# Patient Record
Sex: MALE | Race: WHITE | NOT HISPANIC OR LATINO
[De-identification: names, ages, dates, MRNs, and addresses within clinical notes are randomized per-mention and may not be internally consistent; named-entity substitution may affect disease eponyms.]

---

## 2017-06-30 ENCOUNTER — APPOINTMENT (OUTPATIENT)
Dept: GASTROENTEROLOGY | Facility: CLINIC | Age: 42
End: 2017-06-30

## 2017-06-30 VITALS
SYSTOLIC BLOOD PRESSURE: 140 MMHG | OXYGEN SATURATION: 98 % | BODY MASS INDEX: 32.18 KG/M2 | HEART RATE: 81 BPM | WEIGHT: 205 LBS | TEMPERATURE: 98.2 F | DIASTOLIC BLOOD PRESSURE: 80 MMHG | HEIGHT: 67 IN

## 2017-06-30 DIAGNOSIS — Z87.39 PERSONAL HISTORY OF OTHER DISEASES OF THE MUSCULOSKELETAL SYSTEM AND CONNECTIVE TISSUE: ICD-10-CM

## 2017-06-30 DIAGNOSIS — Z82.49 FAMILY HISTORY OF ISCHEMIC HEART DISEASE AND OTHER DISEASES OF THE CIRCULATORY SYSTEM: ICD-10-CM

## 2017-06-30 DIAGNOSIS — Z78.9 OTHER SPECIFIED HEALTH STATUS: ICD-10-CM

## 2017-06-30 DIAGNOSIS — M54.9 DORSALGIA, UNSPECIFIED: ICD-10-CM

## 2017-06-30 DIAGNOSIS — Z80.43 FAMILY HISTORY OF MALIGNANT NEOPLASM OF TESTIS: ICD-10-CM

## 2017-06-30 DIAGNOSIS — Z80.42 FAMILY HISTORY OF MALIGNANT NEOPLASM OF PROSTATE: ICD-10-CM

## 2017-06-30 DIAGNOSIS — R10.11 RIGHT UPPER QUADRANT PAIN: ICD-10-CM

## 2017-06-30 PROBLEM — Z00.00 ENCOUNTER FOR PREVENTIVE HEALTH EXAMINATION: Status: ACTIVE | Noted: 2017-06-30

## 2017-06-30 RX ORDER — HYDROCORTISONE 25 MG/ML
2.5 LOTION TOPICAL
Qty: 59 | Refills: 0 | Status: ACTIVE | COMMUNITY
Start: 2017-01-04

## 2017-06-30 RX ORDER — FLURANDRENOLIDE 0.5 MG/ML
0.05 LOTION TOPICAL
Qty: 120 | Refills: 0 | Status: ACTIVE | COMMUNITY
Start: 2017-01-05

## 2017-07-02 ENCOUNTER — FORM ENCOUNTER (OUTPATIENT)
Age: 42
End: 2017-07-02

## 2017-07-03 ENCOUNTER — RESULT REVIEW (OUTPATIENT)
Age: 42
End: 2017-07-03

## 2017-07-03 ENCOUNTER — APPOINTMENT (OUTPATIENT)
Dept: ULTRASOUND IMAGING | Facility: IMAGING CENTER | Age: 42
End: 2017-07-03

## 2017-07-03 ENCOUNTER — OUTPATIENT (OUTPATIENT)
Dept: OUTPATIENT SERVICES | Facility: HOSPITAL | Age: 42
LOS: 1 days | End: 2017-07-03
Payer: COMMERCIAL

## 2017-07-03 DIAGNOSIS — R10.11 RIGHT UPPER QUADRANT PAIN: ICD-10-CM

## 2017-07-03 LAB
ALBUMIN SERPL ELPH-MCNC: 4.7 G/DL
ALP BLD-CCNC: 64 U/L
ALT SERPL-CCNC: 27 U/L
ANION GAP SERPL CALC-SCNC: 18 MMOL/L
AST SERPL-CCNC: 31 U/L
BASOPHILS # BLD AUTO: 0.01 K/UL
BASOPHILS NFR BLD AUTO: 0.2 %
BILIRUB SERPL-MCNC: 0.6 MG/DL
BUN SERPL-MCNC: 11 MG/DL
CALCIUM SERPL-MCNC: 9.7 MG/DL
CHLORIDE SERPL-SCNC: 100 MMOL/L
CO2 SERPL-SCNC: 22 MMOL/L
CREAT SERPL-MCNC: 1.09 MG/DL
EOSINOPHIL # BLD AUTO: 0.11 K/UL
EOSINOPHIL NFR BLD AUTO: 1.9 %
GLUCOSE SERPL-MCNC: 81 MG/DL
HCT VFR BLD CALC: 47 %
HGB BLD-MCNC: 15.4 G/DL
IMM GRANULOCYTES NFR BLD AUTO: 0 %
LPL SERPL-CCNC: 63 U/L
LYMPHOCYTES # BLD AUTO: 1.65 K/UL
LYMPHOCYTES NFR BLD AUTO: 27.9 %
MAN DIFF?: NORMAL
MCHC RBC-ENTMCNC: 28.7 PG
MCHC RBC-ENTMCNC: 32.8 GM/DL
MCV RBC AUTO: 87.5 FL
MONOCYTES # BLD AUTO: 0.45 K/UL
MONOCYTES NFR BLD AUTO: 7.6 %
NEUTROPHILS # BLD AUTO: 3.7 K/UL
NEUTROPHILS NFR BLD AUTO: 62.4 %
PLATELET # BLD AUTO: 195 K/UL
POTASSIUM SERPL-SCNC: 4.3 MMOL/L
PROT SERPL-MCNC: 7.8 G/DL
RBC # BLD: 5.37 M/UL
RBC # FLD: 13.7 %
SODIUM SERPL-SCNC: 140 MMOL/L
WBC # FLD AUTO: 5.92 K/UL

## 2017-07-03 PROCEDURE — 76700 US EXAM ABDOM COMPLETE: CPT | Mod: 26

## 2017-07-03 PROCEDURE — 76700 US EXAM ABDOM COMPLETE: CPT

## 2017-07-06 LAB
AMYLASE P SERPL-CCNC: 31 U/L
AMYLASE S SERPL-CCNC: 12 U/L
AMYLASE SERPL-CCNC: 43 U/L

## 2019-09-11 ENCOUNTER — TRANSCRIPTION ENCOUNTER (OUTPATIENT)
Age: 44
End: 2019-09-11

## 2021-02-24 ENCOUNTER — TRANSCRIPTION ENCOUNTER (OUTPATIENT)
Age: 46
End: 2021-02-24

## 2021-02-25 ENCOUNTER — TRANSCRIPTION ENCOUNTER (OUTPATIENT)
Age: 46
End: 2021-02-25

## 2021-03-29 ENCOUNTER — TRANSCRIPTION ENCOUNTER (OUTPATIENT)
Age: 46
End: 2021-03-29

## 2021-06-11 ENCOUNTER — TRANSCRIPTION ENCOUNTER (OUTPATIENT)
Age: 46
End: 2021-06-11

## 2021-11-12 ENCOUNTER — TRANSCRIPTION ENCOUNTER (OUTPATIENT)
Age: 46
End: 2021-11-12

## 2022-11-03 ENCOUNTER — OFFICE (OUTPATIENT)
Dept: URBAN - METROPOLITAN AREA CLINIC 35 | Facility: CLINIC | Age: 47
Setting detail: OPHTHALMOLOGY
End: 2022-11-03
Payer: COMMERCIAL

## 2022-11-03 DIAGNOSIS — H40.013: ICD-10-CM

## 2022-11-03 DIAGNOSIS — H40.053: ICD-10-CM

## 2022-11-03 PROCEDURE — 92014 COMPRE OPH EXAM EST PT 1/>: CPT | Performed by: OPHTHALMOLOGY

## 2022-11-03 PROCEDURE — 92133 CPTRZD OPH DX IMG PST SGM ON: CPT | Performed by: OPHTHALMOLOGY

## 2022-11-03 PROCEDURE — 92083 EXTENDED VISUAL FIELD XM: CPT | Performed by: OPHTHALMOLOGY

## 2022-11-03 PROCEDURE — 92020 GONIOSCOPY: CPT | Performed by: OPHTHALMOLOGY

## 2022-11-03 ASSESSMENT — REFRACTION_AUTOREFRACTION
OS_AXIS: 050
OS_SPHERE: +0.50
OD_CYLINDER: -1.00
OS_CYLINDER: -0.50
OD_AXIS: 106
OD_SPHERE: +0.75

## 2022-11-03 ASSESSMENT — REFRACTION_MANIFEST
OD_CYLINDER: -0.50
OD_VA1: 20/20
OD_AXIS: 105
OS_CYLINDER: SPHERE
OD_SPHERE: +0.50
OS_VA1: 20/20
OS_SPHERE: PLANO

## 2022-11-03 ASSESSMENT — CONFRONTATIONAL VISUAL FIELD TEST (CVF)
OD_FINDINGS: FULL
OS_FINDINGS: FULL

## 2022-11-03 ASSESSMENT — KERATOMETRY
OD_AXISANGLE_DEGREES: 044
METHOD_AUTO_MANUAL: AUTO
OD_K2POWER_DIOPTERS: 43.25
OS_K2POWER_DIOPTERS: 43.00
OS_K1POWER_DIOPTERS: 42.50
OS_AXISANGLE_DEGREES: 145
OD_K1POWER_DIOPTERS: 42.50

## 2022-11-03 ASSESSMENT — SPHEQUIV_DERIVED
OD_SPHEQUIV: 0.25
OS_SPHEQUIV: 0.25
OD_SPHEQUIV: 0.25

## 2022-11-03 ASSESSMENT — VISUAL ACUITY
OS_BCVA: 20/20-1
OD_BCVA: 20/25-2

## 2022-11-03 ASSESSMENT — PACHYMETRY
OD_CT_UM: 587
OD_CT_CORRECTION: -3
OS_CT_CORRECTION: -3
OS_CT_UM: 583

## 2022-11-03 ASSESSMENT — AXIALLENGTH_DERIVED
OD_AL: 23.7248
OD_AL: 23.7248
OS_AL: 23.7713

## 2022-11-17 ENCOUNTER — OFFICE (OUTPATIENT)
Dept: URBAN - METROPOLITAN AREA CLINIC 35 | Facility: CLINIC | Age: 47
Setting detail: OPHTHALMOLOGY
End: 2022-11-17
Payer: COMMERCIAL

## 2022-11-17 DIAGNOSIS — H40.013: ICD-10-CM

## 2022-11-17 DIAGNOSIS — H40.053: ICD-10-CM

## 2022-11-17 PROCEDURE — 99213 OFFICE O/P EST LOW 20 MIN: CPT | Performed by: OPHTHALMOLOGY

## 2022-11-17 ASSESSMENT — AXIALLENGTH_DERIVED
OD_AL: 23.7248
OD_AL: 23.7248
OS_AL: 23.7713

## 2022-11-17 ASSESSMENT — REFRACTION_CURRENTRX
OS_SPHERE: +1.00
OD_VPRISM_DIRECTION: SV
OS_OVR_VA: 20/
OS_VPRISM_DIRECTION: SV
OD_OVR_VA: 20/
OD_SPHERE: +1.00

## 2022-11-17 ASSESSMENT — VISUAL ACUITY
OD_BCVA: 20/20
OS_BCVA: 20/20

## 2022-11-17 ASSESSMENT — KERATOMETRY
METHOD_AUTO_MANUAL: AUTO
OS_K1POWER_DIOPTERS: 42.50
OS_AXISANGLE_DEGREES: 145
OD_K2POWER_DIOPTERS: 43.25
OD_AXISANGLE_DEGREES: 044
OS_K2POWER_DIOPTERS: 43.00
OD_K1POWER_DIOPTERS: 42.50

## 2022-11-17 ASSESSMENT — REFRACTION_AUTOREFRACTION
OS_CYLINDER: -0.50
OD_SPHERE: +0.75
OS_SPHERE: +0.50
OD_AXIS: 106
OD_CYLINDER: -1.00
OS_AXIS: 050

## 2022-11-17 ASSESSMENT — PACHYMETRY
OS_CT_UM: 583
OD_CT_CORRECTION: -3
OD_CT_UM: 587
OS_CT_CORRECTION: -3

## 2022-11-17 ASSESSMENT — REFRACTION_MANIFEST
OS_VA1: 20/20
OS_CYLINDER: SPHERE
OD_AXIS: 105
OD_VA1: 20/20
OD_SPHERE: +0.50
OD_CYLINDER: -0.50
OS_SPHERE: PLANO

## 2022-11-17 ASSESSMENT — TONOMETRY: OD_IOP_MMHG: 21

## 2022-11-17 ASSESSMENT — SPHEQUIV_DERIVED
OD_SPHEQUIV: 0.25
OD_SPHEQUIV: 0.25
OS_SPHEQUIV: 0.25

## 2022-12-29 ENCOUNTER — RX ONLY (RX ONLY)
Age: 47
End: 2022-12-29

## 2022-12-29 ENCOUNTER — OFFICE (OUTPATIENT)
Dept: URBAN - METROPOLITAN AREA CLINIC 35 | Facility: CLINIC | Age: 47
Setting detail: OPHTHALMOLOGY
End: 2022-12-29
Payer: COMMERCIAL

## 2022-12-29 DIAGNOSIS — H40.053: ICD-10-CM

## 2022-12-29 DIAGNOSIS — H40.013: ICD-10-CM

## 2022-12-29 PROCEDURE — 99213 OFFICE O/P EST LOW 20 MIN: CPT | Performed by: OPHTHALMOLOGY

## 2022-12-29 ASSESSMENT — KERATOMETRY
OD_AXISANGLE_DEGREES: 031
OS_K1POWER_DIOPTERS: 43.00
OD_K1POWER_DIOPTERS: 42.50
METHOD_AUTO_MANUAL: AUTO
OS_AXISANGLE_DEGREES: 090
OS_K2POWER_DIOPTERS: 43.00
OD_K2POWER_DIOPTERS: 43.00

## 2022-12-29 ASSESSMENT — AXIALLENGTH_DERIVED
OD_AL: 23.7713
OD_AL: 23.7713

## 2022-12-29 ASSESSMENT — REFRACTION_AUTOREFRACTION
OD_SPHERE: +0.75
OD_CYLINDER: -1.00
OS_SPHERE: PLANO
OS_CYLINDER: -0.50
OS_AXIS: 121
OD_AXIS: 109

## 2022-12-29 ASSESSMENT — VISUAL ACUITY
OS_BCVA: 20/20
OD_BCVA: 20/20

## 2022-12-29 ASSESSMENT — REFRACTION_CURRENTRX
OS_OVR_VA: 20/
OD_SPHERE: +1.00
OS_VPRISM_DIRECTION: SV
OD_VPRISM_DIRECTION: SV
OD_OVR_VA: 20/
OS_SPHERE: +1.00

## 2022-12-29 ASSESSMENT — PACHYMETRY
OS_CT_UM: 583
OD_CT_CORRECTION: -3
OD_CT_UM: 587
OS_CT_CORRECTION: -3

## 2022-12-29 ASSESSMENT — SPHEQUIV_DERIVED
OD_SPHEQUIV: 0.25
OD_SPHEQUIV: 0.25

## 2022-12-29 ASSESSMENT — REFRACTION_MANIFEST
OS_CYLINDER: SPHERE
OS_VA1: 20/20
OD_SPHERE: +0.50
OS_SPHERE: PLANO
OD_VA1: 20/20
OD_CYLINDER: -0.50
OD_AXIS: 105

## 2022-12-29 ASSESSMENT — TONOMETRY
OD_IOP_MMHG: 16
OS_IOP_MMHG: 16

## 2022-12-29 ASSESSMENT — CONFRONTATIONAL VISUAL FIELD TEST (CVF)
OS_FINDINGS: FULL
OD_FINDINGS: FULL

## 2023-04-19 ENCOUNTER — APPOINTMENT (OUTPATIENT)
Dept: UROLOGY | Facility: CLINIC | Age: 48
End: 2023-04-19
Payer: COMMERCIAL

## 2023-04-19 VITALS
RESPIRATION RATE: 17 BRPM | TEMPERATURE: 98.4 F | BODY MASS INDEX: 28.88 KG/M2 | HEART RATE: 72 BPM | DIASTOLIC BLOOD PRESSURE: 93 MMHG | WEIGHT: 195 LBS | SYSTOLIC BLOOD PRESSURE: 138 MMHG | HEIGHT: 69 IN

## 2023-04-19 DIAGNOSIS — R31.29 OTHER MICROSCOPIC HEMATURIA: ICD-10-CM

## 2023-04-19 PROCEDURE — 99203 OFFICE O/P NEW LOW 30 MIN: CPT

## 2023-04-19 PROCEDURE — 88112 CYTOPATH CELL ENHANCE TECH: CPT | Mod: 26

## 2023-04-19 NOTE — HISTORY OF PRESENT ILLNESS
[FreeTextEntry1] : Reason for Visit: Hematuria\par \par This is a 48 year-year-old gentleman with microscopic hematuria.  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  The patient has no history of gross hematuria.  He has a family history of kidney stones he has a family history of prostate cancer.  His primary care doctor had a complete metabolic panel done which showed a PSA of 0.6 and normal creatinine liver function tests are normal he has had microscopic hematuria on prior urinalysis no gross hematuria.  No tobacco history.  No exposure to harsh chemicals.\par \par Patient is referred for evaluation of his condition. Patient denies any gross hematuria or dysuria or urinary incontinence. The patient denies any aggravating or relieving factors. The patient denies any interference of function. The patient is entirely asymptomatic. All other review of systems are negative. Past medical history, family history and social history were inquired and were noncontributory to current condition. Patient [denies tobacco use]. Medications and allergies were reviewed.\par \par On examination, the patient is a healthy-appearing gentleman in no acute distress. He is alert and oriented follows commands. He has normal mood and affect. He is normocephalic. Oral no thrush. Neck is supple. Respirations are unlabored. \par \par Assessment: Microscopic hematuria\par \par PLAN: Renal US, Cysto and urine cytology \par \par I counseled the patient. I discussed the various etiologies of his symptoms. []. Risks and alternatives were discussed. I answered the patient questions. The patient will follow-up as directed and will contact me with any questions or concerns. Thank you for the opportunity to participate in the care of this patient. I'll keep you updated on his progress.\par \par Plan: _____. Followup in _____\par

## 2023-04-21 LAB — URINE CYTOLOGY: NORMAL

## 2023-04-28 ENCOUNTER — OFFICE (OUTPATIENT)
Dept: URBAN - METROPOLITAN AREA CLINIC 35 | Facility: CLINIC | Age: 48
Setting detail: OPHTHALMOLOGY
End: 2023-04-28
Payer: COMMERCIAL

## 2023-04-28 DIAGNOSIS — H40.053: ICD-10-CM

## 2023-04-28 DIAGNOSIS — H40.013: ICD-10-CM

## 2023-04-28 PROCEDURE — 99213 OFFICE O/P EST LOW 20 MIN: CPT | Performed by: OPHTHALMOLOGY

## 2023-04-28 ASSESSMENT — REFRACTION_AUTOREFRACTION
OS_CYLINDER: -0.50
OD_SPHERE: +0.75
OS_SPHERE: PLANO
OD_CYLINDER: -1.00
OS_AXIS: 121
OD_AXIS: 109

## 2023-04-28 ASSESSMENT — PACHYMETRY
OS_CT_UM: 583
OS_CT_CORRECTION: -3
OD_CT_CORRECTION: -3
OD_CT_UM: 587

## 2023-04-28 ASSESSMENT — KERATOMETRY
METHOD_AUTO_MANUAL: AUTO
OS_K2POWER_DIOPTERS: 43.00
OD_K1POWER_DIOPTERS: 42.50
OS_K1POWER_DIOPTERS: 43.00
OS_AXISANGLE_DEGREES: 090
OD_AXISANGLE_DEGREES: 031
OD_K2POWER_DIOPTERS: 43.00

## 2023-04-28 ASSESSMENT — TONOMETRY
OD_IOP_MMHG: 15
OS_IOP_MMHG: 16

## 2023-04-28 ASSESSMENT — REFRACTION_CURRENTRX
OD_SPHERE: +1.00
OS_SPHERE: +1.00
OD_VPRISM_DIRECTION: SV
OS_OVR_VA: 20/
OD_OVR_VA: 20/
OS_VPRISM_DIRECTION: SV

## 2023-04-28 ASSESSMENT — SPHEQUIV_DERIVED
OD_SPHEQUIV: 0.25
OD_SPHEQUIV: 0.25

## 2023-04-28 ASSESSMENT — REFRACTION_MANIFEST
OS_SPHERE: PLANO
OD_SPHERE: +0.50
OD_CYLINDER: -0.50
OD_VA1: 20/20
OD_AXIS: 105
OS_VA1: 20/20
OS_CYLINDER: SPHERE

## 2023-04-28 ASSESSMENT — AXIALLENGTH_DERIVED
OD_AL: 23.7713
OD_AL: 23.7713

## 2023-04-28 ASSESSMENT — VISUAL ACUITY
OD_BCVA: 20/20
OS_BCVA: 20/25

## 2023-04-28 ASSESSMENT — CONFRONTATIONAL VISUAL FIELD TEST (CVF)
OD_FINDINGS: FULL
OS_FINDINGS: FULL

## 2023-05-22 ENCOUNTER — APPOINTMENT (OUTPATIENT)
Dept: UROLOGY | Facility: CLINIC | Age: 48
End: 2023-05-22
Payer: COMMERCIAL

## 2023-05-22 ENCOUNTER — OUTPATIENT (OUTPATIENT)
Dept: OUTPATIENT SERVICES | Facility: HOSPITAL | Age: 48
LOS: 1 days | End: 2023-05-22
Payer: COMMERCIAL

## 2023-05-22 DIAGNOSIS — R35.0 FREQUENCY OF MICTURITION: ICD-10-CM

## 2023-05-22 PROCEDURE — 99214 OFFICE O/P EST MOD 30 MIN: CPT | Mod: 25

## 2023-05-22 PROCEDURE — 76775 US EXAM ABDO BACK WALL LIM: CPT | Mod: 26

## 2023-05-22 PROCEDURE — 76705 ECHO EXAM OF ABDOMEN: CPT

## 2023-05-22 NOTE — PHYSICAL EXAM
[General Appearance - Well Developed] : well developed [General Appearance - Well Nourished] : well nourished [Normal Appearance] : normal appearance [Well Groomed] : well groomed [General Appearance - In No Acute Distress] : no acute distress [Skin Color & Pigmentation] : normal skin color and pigmentation [Normal Station and Gait] : the gait and station were normal for the patient's age

## 2023-05-23 NOTE — HISTORY OF PRESENT ILLNESS
[FreeTextEntry1] : 04/19/2023: Reason for Visit: Hematuria\par This is a 48 year-year-old gentleman with microscopic hematuria.  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  The patient has no history of gross hematuria.  He has a family history of kidney stones he has a family history of prostate cancer.  His primary care doctor had a complete metabolic panel done which showed a PSA of 0.6 and normal creatinine liver function tests are normal he has had microscopic hematuria on prior urinalysis no gross hematuria.  No tobacco history.  No exposure to harsh chemicals.\par Patient is referred for evaluation of his condition. Patient denies any gross hematuria or dysuria or urinary incontinence. The patient denies any aggravating or relieving factors. The patient denies any interference of function. The patient is entirely asymptomatic. All other review of systems are negative. Past medical history, family history and social history were inquired and were noncontributory to current condition. Patient [denies tobacco use]. Medications and allergies were reviewed.\par On examination, the patient is a healthy-appearing gentleman in no acute distress. He is alert and oriented follows commands. He has normal mood and affect. He is normocephalic. Oral no thrush. Neck is supple. Respirations are unlabored. \par Assessment: Microscopic hematuria\par PLAN: Renal US, Cysto and urine cytology \par \par 05/22/2023: Mr. SCHAFER is a 48 year old male presenting today for a renal ultrasound and cystoscopy. He obtained the renal US and was seen later at the office. The US showed "there is a nonvascular hypoechoic focus in the left kidney lower pole cyst vs dilated calyx. Both kidneys are normal in size and echogenicity without stones, hydronephrosis, or solid masses visualized". I explain to the patient that  beside a simple cyst which is benign, his US was normal. there were no tumors and no stones. Due to issue with his co-pay, Pt asked to reschedule his cystoscopy to the [place] office. \par \par ASSESSMENT: Hematuria and LUTS. \par \par PLAN: Follow up for the cystoscopy in [place]. I reviewed at length the risks and benefits and rationale behind the cystoscopy and the patient seems to understand and wants to proceed.\par \par I counseled the patient. I discussed the various etiologies of his symptoms. Risks and alternatives were discussed. I answered the patient questions. The patient will follow-up as directed and will contact me with any questions or concerns. Thank you for the opportunity to participate in the care of this patient. I'll keep you updated on his progress. \par \par

## 2023-05-23 NOTE — ADDENDUM
[FreeTextEntry1] : This note was authored by Dedrick Heller working as a scribe for Dr. Dandre Chavez. I, Dr. Dandre Chavez have reviewed the content of this note and confirm it is true and accurate. I personally performed the history and physical examination and made all the decisions. 05/22/2023

## 2023-05-24 DIAGNOSIS — R31.9 HEMATURIA, UNSPECIFIED: ICD-10-CM

## 2023-05-24 DIAGNOSIS — N40.1 BENIGN PROSTATIC HYPERPLASIA WITH LOWER URINARY TRACT SYMPTOMS: ICD-10-CM

## 2023-06-15 ENCOUNTER — NON-APPOINTMENT (OUTPATIENT)
Age: 48
End: 2023-06-15

## 2023-06-15 ENCOUNTER — APPOINTMENT (OUTPATIENT)
Dept: ORTHOPEDIC SURGERY | Facility: CLINIC | Age: 48
End: 2023-06-15
Payer: COMMERCIAL

## 2023-06-15 VITALS
WEIGHT: 195 LBS | SYSTOLIC BLOOD PRESSURE: 135 MMHG | BODY MASS INDEX: 28.88 KG/M2 | DIASTOLIC BLOOD PRESSURE: 81 MMHG | HEIGHT: 69 IN

## 2023-06-15 DIAGNOSIS — M25.832 OTHER SPECIFIED JOINT DISORDERS, LEFT WRIST: ICD-10-CM

## 2023-06-15 PROCEDURE — 20605 DRAIN/INJ JOINT/BURSA W/O US: CPT | Mod: LT

## 2023-06-15 PROCEDURE — 99204 OFFICE O/P NEW MOD 45 MIN: CPT | Mod: 25

## 2023-06-16 ENCOUNTER — APPOINTMENT (OUTPATIENT)
Dept: UROLOGY | Facility: CLINIC | Age: 48
End: 2023-06-16
Payer: COMMERCIAL

## 2023-06-16 VITALS
DIASTOLIC BLOOD PRESSURE: 88 MMHG | BODY MASS INDEX: 28.88 KG/M2 | WEIGHT: 195 LBS | SYSTOLIC BLOOD PRESSURE: 133 MMHG | OXYGEN SATURATION: 97 % | HEIGHT: 69 IN | HEART RATE: 63 BPM

## 2023-06-16 PROCEDURE — 52000 CYSTOURETHROSCOPY: CPT

## 2023-06-16 NOTE — HISTORY OF PRESENT ILLNESS
[FreeTextEntry1] : 04/19/2023: Reason for Visit: Hematuria\par This is a 48 year-year-old gentleman with microscopic hematuria.  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  The patient has no history of gross hematuria.  He has a family history of kidney stones he has a family history of prostate cancer.  His primary care doctor had a complete metabolic panel done which showed a PSA of 0.6 and normal creatinine liver function tests are normal he has had microscopic hematuria on prior urinalysis no gross hematuria.  No tobacco history.  No exposure to harsh chemicals.\par Patient is referred for evaluation of his condition. Patient denies any gross hematuria or dysuria or urinary incontinence. The patient denies any aggravating or relieving factors. The patient denies any interference of function. The patient is entirely asymptomatic. All other review of systems are negative. Past medical history, family history and social history were inquired and were noncontributory to current condition. Patient [denies tobacco use]. Medications and allergies were reviewed.\par On examination, the patient is a healthy-appearing gentleman in no acute distress. He is alert and oriented follows commands. He has normal mood and affect. He is normocephalic. Oral no thrush. Neck is supple. Respirations are unlabored. \par Assessment: Microscopic hematuria\par PLAN: Renal US, Cysto and urine cytology \par \par 05/22/2023: Mr. SCHAFER is a 48 year old male presenting today for a renal ultrasound and cystoscopy. He obtained the renal US and was seen later at the office. The US showed "there is a nonvascular hypoechoic focus in the left kidney lower pole cyst vs dilated calyx. Both kidneys are normal in size and echogenicity without stones, hydronephrosis, or solid masses visualized". I explain to the patient that  beside a simple cyst which is benign, his US was normal. there were no tumors and no stones. Due to issue with his co-pay, Pt asked to reschedule his cystoscopy to the [place] office. \par ASSESSMENT: Hematuria and LUTS. \par PLAN: Follow up for the cystoscopy in [place]. I reviewed at length the risks and benefits and rationale behind the cystoscopy and the patient seems to understand and wants to proceed.\par \par 06/16/2023 Mr. LISSY SCHAFER is a 48 year old patient presenting today for a cystoscopy. He reports he is doing well overall. He is unchanged from last visit. He says his latest PSA screen from an outside lab was less than 2 ng/mL. \par \par After informed consent was obtained, the patient was prepped and draped in usual sterile fashion. 5 cc of 2% Lidocaine Gel was administered intraurethrally. A flexible cystoscope was advanced through the urethra into the bladder. The anterior urethra was normal. The prostatic urethra was normal. The bladder outlet was normal. The ureteral orifices were in the normal anatomic position bilaterally. No bladder tumor visualized. Mucosal abnormality was not present in the bladder wall. There's no evidence of urothelial cancer. Pt had clear reflux from the left and the right side. \par Cystoscopy shows mild prostatic enlargement with fragile blood vessels likely the cause of microhematuria. \par \par ASSESSMENT: mild BPH, hematuria, normal structural work up\par \par PLAN: pt will have a PSA screen every 2 years. Follow up PRN\par \par I counseled the patient. I discussed the various etiologies of his symptoms. Risks and alternatives were discussed. I answered the patient questions. The patient will follow-up as directed and will contact me with any questions or concerns. Thank you for the opportunity to participate in the care of this patient. I'll keep you updated on his progress. \par \par

## 2023-06-16 NOTE — ADDENDUM
[FreeTextEntry1] : This note was authored by Dedrick Heller working as a scribe for Dr. Dandre Chavez. I, Dr. Dandre Chavez have reviewed the content of this note and confirm it is true and accurate. I personally performed the history and physical examination and made all the decisions. 06/16/2023

## 2023-09-01 ENCOUNTER — OFFICE (OUTPATIENT)
Dept: URBAN - METROPOLITAN AREA CLINIC 35 | Facility: CLINIC | Age: 48
Setting detail: OPHTHALMOLOGY
End: 2023-09-01
Payer: COMMERCIAL

## 2023-09-01 DIAGNOSIS — H40.013: ICD-10-CM

## 2023-09-01 DIAGNOSIS — H40.053: ICD-10-CM

## 2023-09-01 PROCEDURE — 99213 OFFICE O/P EST LOW 20 MIN: CPT | Performed by: OPHTHALMOLOGY

## 2023-09-01 ASSESSMENT — REFRACTION_MANIFEST
OS_VA1: 20/20
OD_AXIS: 105
OS_SPHERE: PLANO
OD_CYLINDER: -0.50
OD_VA1: 20/20
OD_SPHERE: +0.50
OS_CYLINDER: SPHERE

## 2023-09-01 ASSESSMENT — KERATOMETRY
OD_K1POWER_DIOPTERS: 42.50
OD_K2POWER_DIOPTERS: 43.00
OS_AXISANGLE_DEGREES: 090
METHOD_AUTO_MANUAL: AUTO
OD_AXISANGLE_DEGREES: 031
OS_K2POWER_DIOPTERS: 43.00
OS_K1POWER_DIOPTERS: 43.00

## 2023-09-01 ASSESSMENT — AXIALLENGTH_DERIVED
OS_AL: 23.777
OD_AL: 23.7713
OD_AL: 23.8209

## 2023-09-01 ASSESSMENT — REFRACTION_CURRENTRX
OD_VPRISM_DIRECTION: SV
OD_SPHERE: +1.00
OD_OVR_VA: 20/
OS_SPHERE: +1.00
OS_VPRISM_DIRECTION: SV
OS_OVR_VA: 20/

## 2023-09-01 ASSESSMENT — PACHYMETRY
OD_CT_CORRECTION: -3
OS_CT_UM: 583
OD_CT_UM: 587
OS_CT_CORRECTION: -3

## 2023-09-01 ASSESSMENT — VISUAL ACUITY
OS_BCVA: 20/20-2
OD_BCVA: 20/20-

## 2023-09-01 ASSESSMENT — REFRACTION_AUTOREFRACTION
OD_CYLINDER: -0.75
OS_CYLINDER: -0.50
OS_AXIS: 057
OD_AXIS: 110
OD_SPHERE: +0.50
OS_SPHERE: +0.25

## 2023-09-01 ASSESSMENT — SPHEQUIV_DERIVED
OS_SPHEQUIV: 0
OD_SPHEQUIV: 0.125
OD_SPHEQUIV: 0.25

## 2023-09-01 ASSESSMENT — TONOMETRY
OS_IOP_MMHG: 17
OD_IOP_MMHG: 17

## 2023-11-04 ENCOUNTER — NON-APPOINTMENT (OUTPATIENT)
Age: 48
End: 2023-11-04

## 2024-01-22 ENCOUNTER — OFFICE (OUTPATIENT)
Dept: URBAN - METROPOLITAN AREA CLINIC 35 | Facility: CLINIC | Age: 49
Setting detail: OPHTHALMOLOGY
End: 2024-01-22
Payer: COMMERCIAL

## 2024-01-22 DIAGNOSIS — H40.013: ICD-10-CM

## 2024-01-22 DIAGNOSIS — H40.053: ICD-10-CM

## 2024-01-22 PROCEDURE — 92133 CPTRZD OPH DX IMG PST SGM ON: CPT | Performed by: OPHTHALMOLOGY

## 2024-01-22 PROCEDURE — 92083 EXTENDED VISUAL FIELD XM: CPT | Performed by: OPHTHALMOLOGY

## 2024-01-22 PROCEDURE — 92014 COMPRE OPH EXAM EST PT 1/>: CPT | Performed by: OPHTHALMOLOGY

## 2024-01-22 ASSESSMENT — CONFRONTATIONAL VISUAL FIELD TEST (CVF)
OS_FINDINGS: FULL
OD_FINDINGS: FULL

## 2024-01-22 ASSESSMENT — REFRACTION_CURRENTRX
OD_SPHERE: +1.00
OS_VPRISM_DIRECTION: SV
OD_VPRISM_DIRECTION: SV
OS_OVR_VA: 20/
OS_SPHERE: +1.00
OD_OVR_VA: 20/

## 2024-01-22 ASSESSMENT — REFRACTION_AUTOREFRACTION
OS_SPHERE: +0.25
OS_AXIS: 057
OD_CYLINDER: -0.75
OD_SPHERE: +0.50
OS_CYLINDER: -0.50
OD_AXIS: 110

## 2024-01-22 ASSESSMENT — REFRACTION_MANIFEST
OS_VA1: 20/20
OD_CYLINDER: -0.50
OS_CYLINDER: SPHERE
OD_SPHERE: +0.50
OD_VA1: 20/20
OS_SPHERE: PLANO
OD_AXIS: 105

## 2024-01-22 ASSESSMENT — SPHEQUIV_DERIVED
OD_SPHEQUIV: 0.125
OS_SPHEQUIV: 0
OD_SPHEQUIV: 0.25

## 2024-06-24 ENCOUNTER — OFFICE (OUTPATIENT)
Dept: URBAN - METROPOLITAN AREA CLINIC 35 | Facility: CLINIC | Age: 49
Setting detail: OPHTHALMOLOGY
End: 2024-06-24
Payer: COMMERCIAL

## 2024-06-24 DIAGNOSIS — H40.013: ICD-10-CM

## 2024-06-24 DIAGNOSIS — H40.053: ICD-10-CM

## 2024-06-24 PROCEDURE — 99213 OFFICE O/P EST LOW 20 MIN: CPT | Performed by: OPHTHALMOLOGY

## 2024-10-09 ENCOUNTER — DOCTOR'S OFFICE (OUTPATIENT)
Facility: LOCATION | Age: 49
Setting detail: OPHTHALMOLOGY
End: 2024-10-09
Payer: COMMERCIAL

## 2024-10-09 DIAGNOSIS — H40.053: ICD-10-CM

## 2024-10-09 DIAGNOSIS — H40.013: ICD-10-CM

## 2024-10-09 PROCEDURE — 99213 OFFICE O/P EST LOW 20 MIN: CPT | Performed by: OPHTHALMOLOGY

## 2024-10-09 ASSESSMENT — REFRACTION_AUTOREFRACTION
OS_SPHERE: +0.25
OS_CYLINDER: -0.50
OS_AXIS: 057
OD_CYLINDER: -0.75
OD_AXIS: 110
OD_SPHERE: +0.50

## 2024-10-09 ASSESSMENT — KERATOMETRY
OD_AXISANGLE_DEGREES: 031
OS_K2POWER_DIOPTERS: 43.00
OS_K1POWER_DIOPTERS: 43.00
OS_AXISANGLE_DEGREES: 090
OD_K2POWER_DIOPTERS: 43.00
OD_K1POWER_DIOPTERS: 42.50
METHOD_AUTO_MANUAL: AUTO

## 2024-10-09 ASSESSMENT — PACHYMETRY
OS_CT_UM: 583
OD_CT_CORRECTION: -3
OS_CT_CORRECTION: -3
OD_CT_UM: 587

## 2024-10-09 ASSESSMENT — REFRACTION_CURRENTRX
OD_SPHERE: +1.00
OS_VPRISM_DIRECTION: SV
OD_VPRISM_DIRECTION: SV
OS_SPHERE: +1.00
OD_OVR_VA: 20/
OS_OVR_VA: 20/

## 2024-10-09 ASSESSMENT — TONOMETRY
OS_IOP_MMHG: 18
OD_IOP_MMHG: 17

## 2024-10-09 ASSESSMENT — REFRACTION_MANIFEST
OD_CYLINDER: -0.50
OD_VA1: 20/20
OS_CYLINDER: SPHERE
OD_AXIS: 105
OS_SPHERE: PLANO
OD_SPHERE: +0.50
OS_VA1: 20/20

## 2024-10-09 ASSESSMENT — VISUAL ACUITY
OS_BCVA: 20/30
OD_BCVA: 20/25+2

## 2024-10-09 ASSESSMENT — CONFRONTATIONAL VISUAL FIELD TEST (CVF)
OD_FINDINGS: FULL
OS_FINDINGS: FULL

## 2024-12-04 ENCOUNTER — NON-APPOINTMENT (OUTPATIENT)
Age: 49
End: 2024-12-04

## 2024-12-05 ENCOUNTER — APPOINTMENT (OUTPATIENT)
Dept: ORTHOPEDIC SURGERY | Facility: CLINIC | Age: 49
End: 2024-12-05
Payer: COMMERCIAL

## 2024-12-05 VITALS — WEIGHT: 195 LBS | BODY MASS INDEX: 28.88 KG/M2 | HEIGHT: 69 IN

## 2024-12-05 DIAGNOSIS — M54.12 RADICULOPATHY, CERVICAL REGION: ICD-10-CM

## 2024-12-05 DIAGNOSIS — M54.50 LOW BACK PAIN, UNSPECIFIED: ICD-10-CM

## 2024-12-05 PROCEDURE — 72110 X-RAY EXAM L-2 SPINE 4/>VWS: CPT

## 2024-12-05 PROCEDURE — 72040 X-RAY EXAM NECK SPINE 2-3 VW: CPT

## 2024-12-05 PROCEDURE — 72170 X-RAY EXAM OF PELVIS: CPT

## 2024-12-05 PROCEDURE — 99203 OFFICE O/P NEW LOW 30 MIN: CPT

## 2025-01-16 ENCOUNTER — DOCTOR'S OFFICE (OUTPATIENT)
Facility: LOCATION | Age: 50
Setting detail: OPHTHALMOLOGY
End: 2025-01-16
Payer: COMMERCIAL

## 2025-01-16 ENCOUNTER — APPOINTMENT (OUTPATIENT)
Dept: ORTHOPEDIC SURGERY | Facility: CLINIC | Age: 50
End: 2025-01-16

## 2025-01-16 DIAGNOSIS — H40.013: ICD-10-CM

## 2025-01-16 DIAGNOSIS — H40.053: ICD-10-CM

## 2025-01-16 PROCEDURE — 92133 CPTRZD OPH DX IMG PST SGM ON: CPT | Performed by: OPHTHALMOLOGY

## 2025-01-16 PROCEDURE — 92083 EXTENDED VISUAL FIELD XM: CPT | Performed by: OPHTHALMOLOGY

## 2025-01-16 PROCEDURE — 92014 COMPRE OPH EXAM EST PT 1/>: CPT | Performed by: OPHTHALMOLOGY

## 2025-01-16 ASSESSMENT — KERATOMETRY
OD_K1POWER_DIOPTERS: 42.75
OS_K1POWER_DIOPTERS: 42.75
METHOD_AUTO_MANUAL: AUTO
OS_AXISANGLE_DEGREES: 172
OD_AXISANGLE_DEGREES: 044
OS_K2POWER_DIOPTERS: 43.00
OD_K2POWER_DIOPTERS: 43.25

## 2025-01-16 ASSESSMENT — REFRACTION_MANIFEST
OD_AXIS: 105
OD_VA1: 20/20
OS_SPHERE: PLANO
OD_SPHERE: +0.50
OS_VA1: 20/20
OS_CYLINDER: SPHERE
OD_CYLINDER: -0.50

## 2025-01-16 ASSESSMENT — REFRACTION_CURRENTRX
OD_SPHERE: +1.00
OD_VPRISM_DIRECTION: SV
OS_SPHERE: +1.00
OS_VPRISM_DIRECTION: SV
OS_OVR_VA: 20/
OD_OVR_VA: 20/

## 2025-01-16 ASSESSMENT — REFRACTION_AUTOREFRACTION
OS_SPHERE: 0.00
OS_AXIS: 008
OD_CYLINDER: -1.00
OD_AXIS: 102
OS_CYLINDER: -0.25
OD_SPHERE: +0.75

## 2025-01-16 ASSESSMENT — TONOMETRY
OS_IOP_MMHG: 18
OD_IOP_MMHG: 18

## 2025-01-16 ASSESSMENT — CONFRONTATIONAL VISUAL FIELD TEST (CVF)
OS_FINDINGS: FULL
OD_FINDINGS: FULL

## 2025-01-16 ASSESSMENT — PACHYMETRY
OD_CT_UM: 587
OD_CT_CORRECTION: -3
OS_CT_UM: 583
OS_CT_CORRECTION: -3

## 2025-01-16 ASSESSMENT — VISUAL ACUITY
OS_BCVA: 20/20
OD_BCVA: 20/25+2

## 2025-05-19 ENCOUNTER — DOCTOR'S OFFICE (OUTPATIENT)
Facility: LOCATION | Age: 50
Setting detail: OPHTHALMOLOGY
End: 2025-05-19
Payer: COMMERCIAL

## 2025-05-19 DIAGNOSIS — H40.053: ICD-10-CM

## 2025-05-19 DIAGNOSIS — H40.013: ICD-10-CM

## 2025-05-19 PROCEDURE — 92014 COMPRE OPH EXAM EST PT 1/>: CPT | Performed by: OPHTHALMOLOGY

## 2025-05-19 ASSESSMENT — REFRACTION_CURRENTRX
OS_VPRISM_DIRECTION: SV
OD_OVR_VA: 20/
OD_SPHERE: +1.00
OS_OVR_VA: 20/
OD_VPRISM_DIRECTION: SV
OS_SPHERE: +1.00

## 2025-05-19 ASSESSMENT — PACHYMETRY
OS_CT_UM: 583
OS_CT_CORRECTION: -3
OD_CT_UM: 587
OD_CT_CORRECTION: -3

## 2025-05-19 ASSESSMENT — CONFRONTATIONAL VISUAL FIELD TEST (CVF)
OS_FINDINGS: FULL
OD_FINDINGS: FULL

## 2025-05-19 ASSESSMENT — KERATOMETRY
METHOD_AUTO_MANUAL: AUTO
OD_AXISANGLE_DEGREES: 044
OS_K2POWER_DIOPTERS: 43.00
OD_K2POWER_DIOPTERS: 43.25
OD_K1POWER_DIOPTERS: 42.75
OS_K1POWER_DIOPTERS: 42.75
OS_AXISANGLE_DEGREES: 172

## 2025-05-19 ASSESSMENT — REFRACTION_AUTOREFRACTION
OD_CYLINDER: -1.00
OD_AXIS: 102
OS_CYLINDER: -0.25
OD_SPHERE: +0.75
OS_AXIS: 008
OS_SPHERE: 0.00

## 2025-05-19 ASSESSMENT — REFRACTION_MANIFEST
OD_VA1: 20/20
OD_CYLINDER: -0.50
OS_CYLINDER: SPHERE
OD_SPHERE: +0.50
OS_VA1: 20/20
OD_AXIS: 105
OS_SPHERE: PLANO

## 2025-05-19 ASSESSMENT — VISUAL ACUITY
OD_BCVA: 20/20-1
OS_BCVA: 20/20-1

## 2025-08-27 ENCOUNTER — NON-APPOINTMENT (OUTPATIENT)
Age: 50
End: 2025-08-27